# Patient Record
Sex: FEMALE
[De-identification: names, ages, dates, MRNs, and addresses within clinical notes are randomized per-mention and may not be internally consistent; named-entity substitution may affect disease eponyms.]

---

## 2017-08-15 ENCOUNTER — CLINICAL ADVICE (OUTPATIENT)
Age: 27
End: 2017-08-15

## 2017-10-11 ENCOUNTER — APPOINTMENT (OUTPATIENT)
Dept: INTERNAL MEDICINE | Facility: CLINIC | Age: 27
End: 2017-10-11
Payer: COMMERCIAL

## 2017-10-11 VITALS
DIASTOLIC BLOOD PRESSURE: 80 MMHG | TEMPERATURE: 98.7 F | HEIGHT: 63 IN | WEIGHT: 136 LBS | HEART RATE: 60 BPM | BODY MASS INDEX: 24.1 KG/M2 | OXYGEN SATURATION: 99 % | SYSTOLIC BLOOD PRESSURE: 126 MMHG | RESPIRATION RATE: 14 BRPM

## 2017-10-11 PROCEDURE — 90471 IMMUNIZATION ADMIN: CPT

## 2017-10-11 PROCEDURE — 90686 IIV4 VACC NO PRSV 0.5 ML IM: CPT

## 2017-10-11 PROCEDURE — 99395 PREV VISIT EST AGE 18-39: CPT | Mod: 25

## 2019-08-12 ENCOUNTER — APPOINTMENT (OUTPATIENT)
Dept: INTERNAL MEDICINE | Facility: CLINIC | Age: 29
End: 2019-08-12
Payer: COMMERCIAL

## 2019-08-12 VITALS
WEIGHT: 135 LBS | RESPIRATION RATE: 14 BRPM | TEMPERATURE: 97.8 F | HEIGHT: 63 IN | HEART RATE: 64 BPM | SYSTOLIC BLOOD PRESSURE: 100 MMHG | BODY MASS INDEX: 23.92 KG/M2 | DIASTOLIC BLOOD PRESSURE: 60 MMHG | OXYGEN SATURATION: 93 %

## 2019-08-12 DIAGNOSIS — Z00.00 ENCOUNTER FOR GENERAL ADULT MEDICAL EXAMINATION W/OUT ABNORMAL FINDINGS: ICD-10-CM

## 2019-08-12 PROCEDURE — 99395 PREV VISIT EST AGE 18-39: CPT

## 2019-08-12 NOTE — HISTORY OF PRESENT ILLNESS
[FreeTextEntry1] : wellness visit [de-identified] : Deb Swenson, a 29 year old female, with no past medical history is presenting for a routine health visit. 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 She says that for the past few months she has been enrolled in online self-paced courses and admits to having trouble concentrating and performing those tasks. She has been taking Adderall from her sister and taking a quarter of a 10 mg pill. She said that his has been helping her and improves her productivity. She also states that lately she's been having travel anxiety when she goes on planes and says that her grandfather, who is a pediatrician, prescribed her some medication which helped her sleep through a flight. Otherwise, she still endorses pain, throbbing, and ringing in both her years which have been going on since she was 10. Multiple specialists saw her and were not able to figure out the cause of the problem. She does not take any medications for that and is symptomatically managing it on her own by covering her ears in the winter and avoiding irritants. She states that this is a problem she had for years and is not interested in starting any medication for it. Otherwise, she denies any fevers, chills, chest pain, shortness of breath, nausea, vomiting, abdominal pain, diarrhea, constipation, edema.

## 2019-08-12 NOTE — REVIEW OF SYSTEMS
[Earache] : earache [Dizziness] : dizziness [Negative] : Integumentary [Headache] : no headache [Fainting] : no fainting [Confusion] : no confusion [Memory Loss] : no memory loss [Unsteady Walking] : no ataxia [FreeTextEntry4] : bilateral ear throbbing and ringing [de-identified] : mild dizziness associated with ear pain

## 2019-08-12 NOTE — PLAN
[FreeTextEntry1] : Deb Swenson, a 29 year old female, with no past medical history is presenting for a routine health visit. \par \par # ADHD\par - she endorses having trouble concentrating while she is taking online course, takes her sister's prescription of Adderall \par - started on 5 mg of Adderall\par \par # ear throbbing, ringing\par - endorses chronic ear symptoms since she was 10 years old\par - seen multiple specialists who did not find cause\par - she continues to manage her symptoms without medications. Is not interested in starting anything for it\par

## 2020-05-18 ENCOUNTER — APPOINTMENT (OUTPATIENT)
Dept: INTERNAL MEDICINE | Facility: CLINIC | Age: 30
End: 2020-05-18
Payer: COMMERCIAL

## 2020-05-18 DIAGNOSIS — R41.840 ATTENTION AND CONCENTRATION DEFICIT: ICD-10-CM

## 2020-05-18 PROCEDURE — 99441: CPT

## 2020-05-18 RX ORDER — DEXTROAMPHETAMINE SACCHARATE, AMPHETAMINE ASPARTATE, DEXTROAMPHETAMINE SULFATE AND AMPHETAMINE SULFATE 1.25; 1.25; 1.25; 1.25 MG/1; MG/1; MG/1; MG/1
5 TABLET ORAL
Qty: 20 | Refills: 0 | Status: ACTIVE | COMMUNITY
Start: 2019-08-12 | End: 1900-01-01